# Patient Record
Sex: FEMALE | Race: WHITE | Employment: OTHER | URBAN - METROPOLITAN AREA
[De-identification: names, ages, dates, MRNs, and addresses within clinical notes are randomized per-mention and may not be internally consistent; named-entity substitution may affect disease eponyms.]

---

## 2019-11-07 PROBLEM — Z72.0 TOBACCO ABUSE: Status: ACTIVE | Noted: 2019-11-07

## 2019-11-07 PROBLEM — E04.2 MULTIPLE THYROID NODULES: Status: ACTIVE | Noted: 2018-06-12

## 2019-11-07 PROBLEM — E55.9 VITAMIN D DEFICIENCY: Status: ACTIVE | Noted: 2019-11-07

## 2019-11-07 PROBLEM — I05.9 MITRAL VALVE DISORDER: Status: ACTIVE | Noted: 2019-11-07

## 2019-11-07 PROBLEM — E78.00 HYPERCHOLESTEROLEMIA: Status: ACTIVE | Noted: 2018-06-12

## 2019-11-07 PROBLEM — D53.1 MEGALOBLASTIC ANEMIA DUE TO B12 DEFICIENCY: Status: ACTIVE | Noted: 2019-11-07

## 2021-02-13 ENCOUNTER — IMMUNIZATION (OUTPATIENT)
Dept: PRIMARY CARE CLINIC | Age: 66
End: 2021-02-13
Payer: MEDICARE

## 2021-02-13 PROCEDURE — 91300 COVID-19, PFIZER VACCINE 30MCG/0.3ML DOSE: CPT | Performed by: INTERNAL MEDICINE

## 2021-02-13 PROCEDURE — 0001A COVID-19, PFIZER VACCINE 30MCG/0.3ML DOSE: CPT | Performed by: INTERNAL MEDICINE

## 2021-03-02 DIAGNOSIS — E53.8 B12 DEFICIENCY: ICD-10-CM

## 2021-03-02 DIAGNOSIS — E04.2 MULTIPLE THYROID NODULES: ICD-10-CM

## 2021-03-02 PROBLEM — Z72.0 TOBACCO ABUSE: Status: RESOLVED | Noted: 2019-11-07 | Resolved: 2021-03-02

## 2021-03-02 LAB
T4 FREE: 1.05 NG/DL (ref 0.93–1.7)
TSH SERPL DL<=0.05 MIU/L-ACNC: 0.57 UIU/ML (ref 0.27–4.2)
VITAMIN B-12: 361 PG/ML (ref 211–946)

## 2021-03-08 ENCOUNTER — IMMUNIZATION (OUTPATIENT)
Dept: PRIMARY CARE CLINIC | Age: 66
End: 2021-03-08
Payer: MEDICARE

## 2021-03-08 PROCEDURE — 0002A COVID-19, PFIZER VACCINE 30MCG/0.3ML DOSE: CPT | Performed by: NURSE PRACTITIONER

## 2021-03-08 PROCEDURE — 91300 COVID-19, PFIZER VACCINE 30MCG/0.3ML DOSE: CPT | Performed by: NURSE PRACTITIONER

## 2021-08-26 ENCOUNTER — HOSPITAL ENCOUNTER (EMERGENCY)
Age: 66
Discharge: HOME | End: 2021-08-26
Payer: MEDICARE

## 2021-08-26 VITALS
HEIGHT: 64 IN | TEMPERATURE: 97.3 F | DIASTOLIC BLOOD PRESSURE: 62 MMHG | WEIGHT: 140 LBS | SYSTOLIC BLOOD PRESSURE: 108 MMHG | OXYGEN SATURATION: 98 % | HEART RATE: 82 BPM | RESPIRATION RATE: 16 BRPM | BODY MASS INDEX: 23.9 KG/M2

## 2021-08-26 DIAGNOSIS — H00.012 HORDEOLUM EXTERNUM OF RIGHT LOWER EYELID: Primary | ICD-10-CM

## 2021-08-26 DIAGNOSIS — H01.002 BLEPHARITIS OF RIGHT LOWER EYELID, UNSPECIFIED TYPE: ICD-10-CM

## 2021-08-26 PROCEDURE — 90715 TDAP VACCINE 7 YRS/> IM: CPT | Performed by: NURSE PRACTITIONER

## 2021-08-26 PROCEDURE — 90471 IMMUNIZATION ADMIN: CPT | Performed by: NURSE PRACTITIONER

## 2021-08-26 PROCEDURE — 6360000002 HC RX W HCPCS: Performed by: NURSE PRACTITIONER

## 2021-08-26 PROCEDURE — 99282 EMERGENCY DEPT VISIT SF MDM: CPT

## 2021-08-26 RX ORDER — ERYTHROMYCIN 5 MG/G
OINTMENT OPHTHALMIC
Qty: 3.5 G | Refills: 0 | Status: SHIPPED | OUTPATIENT
Start: 2021-08-26 | End: 2021-09-05

## 2021-08-26 RX ORDER — AZITHROMYCIN 250 MG/1
TABLET, FILM COATED ORAL
Qty: 1 PACKET | Refills: 0 | Status: SHIPPED | OUTPATIENT
Start: 2021-08-26 | End: 2021-09-05

## 2021-08-26 RX ADMIN — TETANUS TOXOID, REDUCED DIPHTHERIA TOXOID AND ACELLULAR PERTUSSIS VACCINE, ADSORBED 0.5 ML: 5; 2.5; 8; 8; 2.5 SUSPENSION INTRAMUSCULAR at 15:32

## 2021-08-29 NOTE — ED PROVIDER NOTES
[address]  Department of Emergency Medicine   ED  Encounter Note  Admit Date/RoomTime: 2021  2:38 PM  ED Room:     NAME: Sammi Woodson  : 1955  MRN: [id number]     Chief Complaint:  Facial Swelling (Was trying warm compresses. Noticed swelling in right cheek as well. )    History of Present Illness        Sammi Woodson is a 78 y.o. old female presenting to the emergency department by private vehicle, for non-traumatic waxing and waning erythema to right lower eye lid, which began several day(s) prior to arrival.  There has been no obvious mechanism causing complaint. Since onset her symptoms have been improving and mild in severity. Associated signs & symptoms of: erythema to the right lower eye lid. The patients tetanus status is unknown. Patient states that she called her primary care physician and was unable to be seen in the office. Patient was told to come to the emergency department for evaluation. Patient denies any blurred vision or pain. She denies any fever or chills. Circumstances:    []  Contact Lens Use     []  Recent URI Sx's     []  Spontaneous Onset     []  Close Contact w/similar Sx's     []  Work Related     History of:     []   Glaucoma     []   Recent Eye Surgery     ROS   Pertinent positives and negatives are stated within HPI, all other systems reviewed and are negative. Past Medical History:  has no past medical history on file. Surgical History:  has a past surgical history that includes Tonsillectomy (1959); Breast reduction surgery; hernia repair (); Cholecystectomy, laparoscopic (); Varicose vein surgery (2009); Colonoscopy (2008); and Hysterectomy, vaginal (1974). Social History:  reports that she quit smoking about 3 years ago. Her smoking use included cigarettes. She started smoking about 49 years ago. She has a 46.00 pack-year smoking history.  She has never used smokeless tobacco. She reports current alcohol use. Family History: family history includes Atrial Fibrillation in her mother; COPD in her mother; Cancer in her father; Hypertension in her brother and mother; Kidney Disease in her brother; Kidney stones in her brother. Allergies: Codeine, Morphine, Atorvastatin, Cephalexin, Cephalosporins, and Augmentin [amoxicillin-pot clavulanate]    Physical Exam   Oxygen Saturation Interpretation: Normal.        ED Triage Vitals   BP Temp Temp Source Pulse Resp SpO2 Height Weight   08/26/21 1446 08/26/21 1433 08/26/21 1433 08/26/21 1433 08/26/21 1433 08/26/21 1433 08/26/21 1445 08/26/21 1445   108/62 97.3 °F (36.3 °C) Infrared 82 16 98 % 5' 4\" (1.626 m) 140 lb (63.5 kg)         Constitutional:  Alert, development consistent with age. HENT:  NC/NT. Airway patent. Neck:  Normal ROM. Supple. Eyes:         Pupils: equal, round, reactive to light and accommodation. Eyelids: Right lower Swelling/redness:  mild swelling and erythema, hordeolum present       Conjunctiva: Bilateral no abnormal findings. Sclera: Bilateral normal appearing. Cornea: Bilateral normal.       EOM:  Intact Bilaterally. Fundoscopic:  grossly normal- discs sharp. Visual Acuity:  Within Normal Limit. Integument:  No rashes, erythema present, unless noted elsewhere. Lymphatics: No lymphangitis or adenopathy noted. Neurological:  Oriented. Motor functions intact. Lab / Imaging Results   (All laboratory and radiology results have been personally reviewed by myself)  Labs:  No results found for this visit on 08/26/21. Imaging: All Radiology results interpreted by Radiologist unless otherwise noted.   No orders to display       ED Course / Medical Decision Making     Medications   Tetanus-Diphth-Acell Pertussis (BOOSTRIX) injection 0.5 mL (0.5 mLs IntraMUSCular Given 8/26/21 1532)            Consult(s):   None    Procedure(s):  no procedures performed    MDM:   At this time the patient is without objective evidence of an acute process requiring hospitalization or inpatient management. They have remained hemodynamically stable throughout their entire ED visit and are stable for discharge with outpatient follow-up. The plan has been discussed in detail and they are aware of the specific conditions for emergent return, as well as the importance of follow-up. Plan of Care/Counseling:  RAFITA Staley CNP reviewed today's visit with the patient in addition to providing specific details for the plan of care and counseling regarding the diagnosis and prognosis. Questions are answered at this time and are agreeable with the plan. Assessment      1. Hordeolum externum of right lower eyelid    2. Blepharitis of right lower eyelid, unspecified type      Plan   Discharged home. Patient condition is good    New Medications     Discharge Medication List as of 8/26/2021  3:20 PM      START taking these medications    Details   erythromycin (ROMYCIN) 5 MG/GM ophthalmic ointment Apply 1/2 inch ribbon to the lower eyelid of the right eye 3 times a day for 10 days, Disp-3.5 g, R-0, Normal      azithromycin (ZITHROMAX Z-MIRACLE) 250 MG tablet TAKE 500MG PO DAY ONE. .. 250MG PO DAY TWO THROUGH FIVE DISPENSE 6 TABS NO REFILLS, Disp-1 packet, R-0Normal           Electronically signed by RAFITA Staley CNP   DD: 8/29/21  **This report was transcribed using voice recognition software. Every effort was made to ensure accuracy; however, inadvertent computerized transcription errors may be present.   END OF ED PROVIDER NOTE       RAFITA Andres CNP  08/29/21 2023

## 2021-08-30 ENCOUNTER — OFFICE VISIT (OUTPATIENT)
Dept: ENT CLINIC | Age: 66
End: 2021-08-30
Payer: MEDICARE

## 2021-08-30 VITALS
SYSTOLIC BLOOD PRESSURE: 111 MMHG | WEIGHT: 138 LBS | TEMPERATURE: 97.2 F | OXYGEN SATURATION: 96 % | HEIGHT: 65 IN | BODY MASS INDEX: 22.99 KG/M2 | DIASTOLIC BLOOD PRESSURE: 71 MMHG | HEART RATE: 78 BPM

## 2021-08-30 DIAGNOSIS — H61.22 IMPACTED CERUMEN OF LEFT EAR: Primary | ICD-10-CM

## 2021-08-30 PROCEDURE — 69210 REMOVE IMPACTED EAR WAX UNI: CPT | Performed by: NURSE PRACTITIONER

## 2021-08-30 PROCEDURE — G8399 PT W/DXA RESULTS DOCUMENT: HCPCS | Performed by: NURSE PRACTITIONER

## 2021-08-30 PROCEDURE — 1123F ACP DISCUSS/DSCN MKR DOCD: CPT | Performed by: NURSE PRACTITIONER

## 2021-08-30 PROCEDURE — G8427 DOCREV CUR MEDS BY ELIG CLIN: HCPCS | Performed by: NURSE PRACTITIONER

## 2021-08-30 PROCEDURE — 99203 OFFICE O/P NEW LOW 30 MIN: CPT | Performed by: NURSE PRACTITIONER

## 2021-08-30 PROCEDURE — 3017F COLORECTAL CA SCREEN DOC REV: CPT | Performed by: NURSE PRACTITIONER

## 2021-08-30 PROCEDURE — G8420 CALC BMI NORM PARAMETERS: HCPCS | Performed by: NURSE PRACTITIONER

## 2021-08-30 PROCEDURE — 1036F TOBACCO NON-USER: CPT | Performed by: NURSE PRACTITIONER

## 2021-08-30 PROCEDURE — 1090F PRES/ABSN URINE INCON ASSESS: CPT | Performed by: NURSE PRACTITIONER

## 2021-08-30 PROCEDURE — G9899 SCRN MAM PERF RSLTS DOC: HCPCS | Performed by: NURSE PRACTITIONER

## 2021-08-30 PROCEDURE — 4040F PNEUMOC VAC/ADMIN/RCVD: CPT | Performed by: NURSE PRACTITIONER

## 2021-08-30 NOTE — PROGRESS NOTES
[facility] Otolaryngology  Dr. Mirella Jones. MACIEL Hood Ms.Ed. New Consult       Patient Name:  Toño Garcia  :  1955     CHIEF C/O:    Chief Complaint   Patient presents with    Ear Problem     new patient with bilateral impacted cerumen        HISTORY OBTAINED FROM:  patient    HISTORY OF PRESENT ILLNESS:       Tanya Jansen is a 78y.o. year old female, here today for impacted cerumen bilaterally. Has had issues with cerumen impactions for many years  Recently had them cleaned at PCP office  Former patient of Dr. Kendall Kathleen who would clean her ears yearly  No current pain or drainage from either ear  No current muffled hearing  No hx of hearing loss  No hx of recurrent infections or previous ear surgeries. History reviewed. No pertinent past medical history. Past Surgical History:   Procedure Laterality Date    BREAST REDUCTION SURGERY      CHOLECYSTECTOMY, LAPAROSCOPIC      Dr Yahaira Main COLONOSCOPY  2008    Dr Stephen Storey HERNIA REPAIR  2004    HYSTERECTOMY, VAGINAL  1974    cervical cancer    TONSILLECTOMY  1959    VARICOSE VEIN SURGERY  2009       Current Outpatient Medications:     erythromycin (ROMYCIN) 5 MG/GM ophthalmic ointment, Apply 1/2 inch ribbon to the lower eyelid of the right eye 3 times a day for 10 days, Disp: 3.5 g, Rfl: 0    azithromycin (ZITHROMAX Z-MIRACLE) 250 MG tablet, TAKE 500MG PO DAY ONE. ..  250MG PO DAY TWO THROUGH FIVE DISPENSE 6 TABS NO REFILLS, Disp: 1 packet, Rfl: 0    cyanocobalamin 1000 MCG/ML injection, Inject 1 mL into the muscle every 30 days, Disp: 3 vial, Rfl: 5    Cholecalciferol 2000 units CAPS, Take 1 capsule by mouth daily, Disp: , Rfl:     estradiol (VIVELLE) 0.0375 MG/24HR, Remove the patch and replace every Monday and Thursday, Disp: , Rfl:     hydroquinone 4 % cream, APPLY SMALL AMOUNT TO FACE AT NIGHT, Disp: , Rfl: 3    methIMAzole (TAPAZOLE) 5 MG tablet, Take 5 mg by mouth daily , Disp: , Rfl:     tretinoin (RETIN-A) 0.05 % cream, Apply small amount to face once daily, Disp: , Rfl:     Calcium 500-100 MG-UNIT CHEW, Take 2 each by mouth daily, Disp: , Rfl:   Codeine, Morphine, Atorvastatin, Cephalexin, Cephalosporins, and Augmentin [amoxicillin-pot clavulanate]  Social History     Tobacco Use    Smoking status: Former Smoker     Packs/day: 1.00     Years: 46.00     Pack years: 46.00     Types: Cigarettes     Start date: 0     Quit date: 2018     Years since quitting: 3.6    Smokeless tobacco: Never Used    Tobacco comment: quit 2018   Substance Use Topics    Alcohol use: Yes     Comment: social drinker    Drug use: Not on file     Family History   Problem Relation Age of Onset    Atrial Fibrillation Mother     COPD Mother     Hypertension Mother     Cancer Father         bladder    Kidney Disease Brother     Kidney stones Brother     Hypertension Brother        Review of Systems   Constitutional: Negative. Negative for activity change and appetite change. HENT: Positive for hearing loss. Negative for ear discharge and ear pain. Eyes: Negative. Respiratory: Negative. Negative for shortness of breath and stridor. Cardiovascular: Negative. Negative for chest pain and palpitations. Endocrine: Negative. Musculoskeletal: Negative. Skin: Negative. Neurological: Negative. Negative for dizziness. Hematological: Negative. Psychiatric/Behavioral: Negative. /71 (Site: Left Upper Arm, Position: Sitting, Cuff Size: Medium Adult)   Pulse 78   Temp 97.2 °F (36.2 °C)   Ht 5' 4.5\" (1.638 m)   Wt 138 lb (62.6 kg)   SpO2 96%   BMI 23.32 kg/m²   Physical Exam  Constitutional:       Appearance: Normal appearance. HENT:      Head: Normocephalic. Right Ear: Tympanic membrane, ear canal and external ear normal.      Left Ear: Tympanic membrane, ear canal and external ear normal. There is impacted cerumen. Ears:      Comments: Left TM normal after cerumen removed.      Nose: Nose normal. No rhinorrhea. Right Turbinates: Not pale. Left Turbinates: Not pale. Mouth/Throat:      Lips: Pink. Mouth: Mucous membranes are moist.      Pharynx: Oropharynx is clear. Eyes:      Conjunctiva/sclera: Conjunctivae normal.      Pupils: Pupils are equal, round, and reactive to light. Cardiovascular:      Rate and Rhythm: Normal rate and regular rhythm. Pulses: Normal pulses. Pulmonary:      Effort: Pulmonary effort is normal. No respiratory distress. Breath sounds: No stridor. Musculoskeletal:         General: Normal range of motion. Cervical back: Normal range of motion. No rigidity. No muscular tenderness. Skin:     General: Skin is warm and dry. Neurological:      General: No focal deficit present. Mental Status: She is alert and oriented to person, place, and time. Psychiatric:         Mood and Affect: Mood normal.         Behavior: Behavior normal.         Thought Content: Thought content normal.         Judgment: Judgment normal.         IMPRESSION/PLAN:  Cerumen removal     Auditory canal(s) left ear completely obstructed with cerumen. Cerumen was gently removed using soft plastic curette, gentle suction. Tympanic membranes are intact following the procedure. Auditory canals appear normal.      Jinny Anderson was seen today for ear problem. Diagnoses and all orders for this visit:    Impacted cerumen of left ear  -     OK REMOVAL IMPACTED CERUMEN INSTRUMENTATION UNILAT        Left cerumen impaction removed without difficulty. Patient denies any current pain or discomfort. Recommended that patient begin using Debrox 1-2 times monthly and then 3 to 5 days prior to her next appointment. She will follow up in 6 months for repeat cleaning. She is instructed to call with any new or worsening symptoms prior to her next appointment.         Ashley Blancas, MSN, FNP-C  [address], Nose and Throat    The information contained in this note has been dictated using drug and medical speech recognition software and may contain errors

## 2021-09-07 ASSESSMENT — ENCOUNTER SYMPTOMS
EYES NEGATIVE: 1
SHORTNESS OF BREATH: 0
STRIDOR: 0
RESPIRATORY NEGATIVE: 1

## 2021-10-20 DIAGNOSIS — E78.00 HYPERCHOLESTEROLEMIA: ICD-10-CM

## 2021-10-20 LAB
REASON FOR REJECTION: NORMAL
REJECTED TEST: NORMAL

## 2022-02-28 ENCOUNTER — OFFICE VISIT (OUTPATIENT)
Dept: ENT CLINIC | Age: 67
End: 2022-02-28
Payer: MEDICARE

## 2022-02-28 VITALS
SYSTOLIC BLOOD PRESSURE: 108 MMHG | DIASTOLIC BLOOD PRESSURE: 74 MMHG | BODY MASS INDEX: 23.49 KG/M2 | HEART RATE: 81 BPM | WEIGHT: 141 LBS | HEIGHT: 65 IN

## 2022-02-28 DIAGNOSIS — H61.23 BILATERAL IMPACTED CERUMEN: Primary | ICD-10-CM

## 2022-02-28 PROCEDURE — 69210 REMOVE IMPACTED EAR WAX UNI: CPT | Performed by: NURSE PRACTITIONER

## 2022-02-28 RX ORDER — ERGOCALCIFEROL 1.25 MG/1
CAPSULE ORAL
COMMUNITY
Start: 2022-02-08

## 2022-02-28 RX ORDER — ROSUVASTATIN CALCIUM 10 MG/1
TABLET, COATED ORAL
COMMUNITY
Start: 2022-02-08

## 2022-08-02 ENCOUNTER — PROCEDURE VISIT (OUTPATIENT)
Dept: AUDIOLOGY | Age: 67
End: 2022-08-02
Payer: MEDICARE

## 2022-08-02 ENCOUNTER — OFFICE VISIT (OUTPATIENT)
Dept: ENT CLINIC | Age: 67
End: 2022-08-02
Payer: MEDICARE

## 2022-08-02 VITALS
SYSTOLIC BLOOD PRESSURE: 115 MMHG | HEIGHT: 64 IN | DIASTOLIC BLOOD PRESSURE: 73 MMHG | HEART RATE: 80 BPM | WEIGHT: 141 LBS | BODY MASS INDEX: 24.07 KG/M2

## 2022-08-02 DIAGNOSIS — H65.02 ACUTE SEROUS OTITIS MEDIA OF LEFT EAR, RECURRENCE NOT SPECIFIED: ICD-10-CM

## 2022-08-02 DIAGNOSIS — H69.82 DYSFUNCTION OF LEFT EUSTACHIAN TUBE: Primary | ICD-10-CM

## 2022-08-02 DIAGNOSIS — H61.23 BILATERAL IMPACTED CERUMEN: ICD-10-CM

## 2022-08-02 DIAGNOSIS — H66.92 LEFT OTITIS MEDIA, UNSPECIFIED OTITIS MEDIA TYPE: Primary | ICD-10-CM

## 2022-08-02 PROCEDURE — G8420 CALC BMI NORM PARAMETERS: HCPCS | Performed by: NURSE PRACTITIONER

## 2022-08-02 PROCEDURE — G8427 DOCREV CUR MEDS BY ELIG CLIN: HCPCS | Performed by: NURSE PRACTITIONER

## 2022-08-02 PROCEDURE — G8399 PT W/DXA RESULTS DOCUMENT: HCPCS | Performed by: NURSE PRACTITIONER

## 2022-08-02 PROCEDURE — 1090F PRES/ABSN URINE INCON ASSESS: CPT | Performed by: NURSE PRACTITIONER

## 2022-08-02 PROCEDURE — 92567 TYMPANOMETRY: CPT | Performed by: AUDIOLOGIST

## 2022-08-02 PROCEDURE — 1036F TOBACCO NON-USER: CPT | Performed by: NURSE PRACTITIONER

## 2022-08-02 PROCEDURE — 69210 REMOVE IMPACTED EAR WAX UNI: CPT | Performed by: NURSE PRACTITIONER

## 2022-08-02 PROCEDURE — 1123F ACP DISCUSS/DSCN MKR DOCD: CPT | Performed by: NURSE PRACTITIONER

## 2022-08-02 PROCEDURE — 99213 OFFICE O/P EST LOW 20 MIN: CPT | Performed by: NURSE PRACTITIONER

## 2022-08-02 PROCEDURE — 3017F COLORECTAL CA SCREEN DOC REV: CPT | Performed by: NURSE PRACTITIONER

## 2022-08-02 ASSESSMENT — ENCOUNTER SYMPTOMS
SINUS PAIN: 0
RESPIRATORY NEGATIVE: 1
RHINORRHEA: 0
SINUS PRESSURE: 0
STRIDOR: 0
SHORTNESS OF BREATH: 0
EYES NEGATIVE: 1

## 2022-08-02 NOTE — PROGRESS NOTES
[facility] Otolaryngology  Dr. Minh Alas. Ban Russo Ms.Ed        Patient Name:  Emy Jacinto  :  1955     CHIEF C/O:    Chief Complaint   Patient presents with    Follow-up     F/u 6 month cerumen       HISTORY OBTAINED FROM:  patient    HISTORY OF PRESENT ILLNESS:       Rahul Barakat is a 75y.o. year old female, here today for follow up of cerumen impactions and ear fullness. Patient was last seen 6 months ago for cerumen impactions with removal.  She states that over the last 1 month after suffering from congestion and other sinus symptoms she has noticed increased fullness in the left ear. She denies any pain or pressure at this time. She denies any increased tinnitus at this time. She does state that her hearing in the left ear is muffled. She denies any use of Debrox for cerumen removal stating that it makes her ears feel more clogged. She denies any recent fevers or recent antibiotics. She denies any current congestion, rhinorrhea, or postnasal drainage. She states she recently tried Astepro with no relief and then started Flonase which she states is making some improvement in her fullness symptoms.       Past Medical History:   Diagnosis Date    Hx of mitral valve prolapse      Past Surgical History:   Procedure Laterality Date    BREAST REDUCTION SURGERY      CHOLECYSTECTOMY, LAPAROSCOPIC      Dr Percy Jackson    COLONOSCOPY  2008    Dr Chucho Ness 48  2004    HYSTERECTOMY, VAGINAL  1974    cervical cancer    TONSILLECTOMY  1959    VARICOSE VEIN SURGERY  2009       Current Outpatient Medications:     rosuvastatin (CRESTOR) 10 MG tablet, TAKE 1 TABLET BY MOUTH ONCE A DAY, Disp: , Rfl:     Coenzyme Q10 (CO Q 10 PO), Take by mouth, Disp: , Rfl:     cyanocobalamin 1000 MCG/ML injection, Inject 1 mL into the muscle every 30 days, Disp: 3 vial, Rfl: 5    Cholecalciferol 2000 units CAPS, Take 1 capsule by mouth daily, Disp: , Rfl:     estradiol (Dariana Genera) 0.0375 MG/24HR, Remove the patch and replace every Monday and Thursday, Disp: , Rfl:     hydroquinone 4 % cream, APPLY SMALL AMOUNT TO FACE AT NIGHT, Disp: , Rfl: 3    methIMAzole (TAPAZOLE) 5 MG tablet, Take 5 mg by mouth daily , Disp: , Rfl:     tretinoin (RETIN-A) 0.05 % cream, Apply small amount to face once daily, Disp: , Rfl:     Calcium 500-100 MG-UNIT CHEW, Take 2 each by mouth daily, Disp: , Rfl:     vitamin D (ERGOCALCIFEROL) 1.25 MG (02833 UT) CAPS capsule, TAKE 1 CAPSULE BY MOUTH EVERY WEEK (Patient not taking: Reported on 2022), Disp: , Rfl:   Codeine, Morphine, Cephalexin, Cephalosporins, Levofloxacin, and Augmentin [amoxicillin-pot clavulanate]  Social History     Tobacco Use    Smoking status: Former     Packs/day: 1.00     Years: 46.00     Pack years: 46.00     Types: Cigarettes     Start date: 0     Quit date:      Years since quittin.5    Smokeless tobacco: Never    Tobacco comments:     quit 2018   Substance Use Topics    Alcohol use: Yes     Comment: social drinker    Drug use: Never     Family History   Problem Relation Age of Onset    Atrial Fibrillation Mother     COPD Mother     Hypertension Mother     Cancer Father         bladder    Kidney Disease Brother     Kidney stones Brother     Hypertension Brother        Review of Systems   Constitutional: Negative. Negative for activity change and appetite change. HENT:  Positive for ear pain (Fullness left ear) and hearing loss. Negative for congestion, postnasal drip, rhinorrhea, sinus pressure and sinus pain. Eyes: Negative. Respiratory: Negative. Negative for shortness of breath and stridor. Cardiovascular: Negative. Negative for chest pain and palpitations. Endocrine: Negative. Musculoskeletal: Negative. Skin: Negative. Neurological: Negative. Negative for dizziness. Hematological: Negative. Psychiatric/Behavioral: Negative.        /73   Pulse 80   Ht 5' 4\" (1.626 m)   Wt 141 lb (64 kg) cerumen she continued to have a muffled sensation in the left ear. Visual exam reveals what appears to be a middle ear effusion. This was confirmed on tympanogram.  At this time she will continue with the Flonase she recently started, 2 sprays each nostril once daily with nasal saline spray, 2 sprays each nostril 3-4 times daily. She will follow-up in 6 weeks for reevaluation. She is instructed to call with any new or worsening of symptoms prior to her next appointment.       ENRIQUE Zheng, FNP-C  [address] [place] [address], Nose and Throat    The information contained in this note has been dictated using drug and medical speech recognition software and may contain errors

## 2022-09-23 ENCOUNTER — PROCEDURE VISIT (OUTPATIENT)
Dept: AUDIOLOGY | Age: 67
End: 2022-09-23
Payer: MEDICARE

## 2022-09-23 ENCOUNTER — OFFICE VISIT (OUTPATIENT)
Dept: ENT CLINIC | Age: 67
End: 2022-09-23
Payer: MEDICARE

## 2022-09-23 VITALS
HEIGHT: 64 IN | SYSTOLIC BLOOD PRESSURE: 105 MMHG | DIASTOLIC BLOOD PRESSURE: 70 MMHG | WEIGHT: 135 LBS | HEART RATE: 81 BPM | BODY MASS INDEX: 23.05 KG/M2

## 2022-09-23 DIAGNOSIS — J34.2 DNS (DEVIATED NASAL SEPTUM): ICD-10-CM

## 2022-09-23 DIAGNOSIS — R53.82 CHRONIC FATIGUE: Primary | ICD-10-CM

## 2022-09-23 DIAGNOSIS — G47.30 SLEEP DISORDER BREATHING: ICD-10-CM

## 2022-09-23 DIAGNOSIS — R06.83 SNORING: ICD-10-CM

## 2022-09-23 DIAGNOSIS — H93.8X3 EAR FULLNESS, BILATERAL: Primary | ICD-10-CM

## 2022-09-23 DIAGNOSIS — R09.81 NASAL CONGESTION: ICD-10-CM

## 2022-09-23 PROCEDURE — 1090F PRES/ABSN URINE INCON ASSESS: CPT | Performed by: NURSE PRACTITIONER

## 2022-09-23 PROCEDURE — G8427 DOCREV CUR MEDS BY ELIG CLIN: HCPCS | Performed by: NURSE PRACTITIONER

## 2022-09-23 PROCEDURE — 92567 TYMPANOMETRY: CPT | Performed by: AUDIOLOGIST

## 2022-09-23 PROCEDURE — 1036F TOBACCO NON-USER: CPT | Performed by: NURSE PRACTITIONER

## 2022-09-23 PROCEDURE — G8399 PT W/DXA RESULTS DOCUMENT: HCPCS | Performed by: NURSE PRACTITIONER

## 2022-09-23 PROCEDURE — 3017F COLORECTAL CA SCREEN DOC REV: CPT | Performed by: NURSE PRACTITIONER

## 2022-09-23 PROCEDURE — G8420 CALC BMI NORM PARAMETERS: HCPCS | Performed by: NURSE PRACTITIONER

## 2022-09-23 PROCEDURE — 1123F ACP DISCUSS/DSCN MKR DOCD: CPT | Performed by: NURSE PRACTITIONER

## 2022-09-23 PROCEDURE — 99214 OFFICE O/P EST MOD 30 MIN: CPT | Performed by: NURSE PRACTITIONER

## 2022-09-23 RX ORDER — FLUTICASONE PROPIONATE 50 MCG
2 SPRAY, SUSPENSION (ML) NASAL DAILY
Qty: 48 G | Refills: 1 | Status: SHIPPED | OUTPATIENT
Start: 2022-09-23

## 2022-09-23 ASSESSMENT — ENCOUNTER SYMPTOMS
SHORTNESS OF BREATH: 0
SINUS PRESSURE: 0
RHINORRHEA: 0
EYES NEGATIVE: 1
SINUS PAIN: 0
RESPIRATORY NEGATIVE: 1
STRIDOR: 0

## 2022-09-23 NOTE — PROGRESS NOTES
This patient was referred for tympanometric testing by HENRIQUE Hernandez due to ear fullness. Tympanometry revealed normal middle ear peak pressure and compliance, bilaterally. The results were reviewed with the patient. Recommendations for follow up will be made pending physician consult.     Electronically signed by Dave Vega on 9/23/2022 at 3:52 PM

## 2022-09-23 NOTE — PROGRESS NOTES
[facility] Otolaryngology  Dr. Dusty Ames Ms.Ed        Patient Name:  Vikram Peguero  :  1955     CHIEF C/O:    Chief Complaint   Patient presents with    Follow-up     6 week b/l ear pressure/cerumen       HISTORY OBTAINED FROM:  patient    HISTORY OF PRESENT ILLNESS:       [facility] is a 75y.o. year old female, here today for follow up of ear fullness. Patient was last seen 6 weeks ago and placed on Flonase nasal spray which she used for several weeks with improvement in her ear pressure. She states that she is at this time using the medication more intermittently but has stopped using the Astelin spray as she did not tolerate the aftertaste. She currently denies any pressure or muffled hearing sensation. Patient does continue to complain of mild congestion symptoms and has noticed an increase in her snoring. She also suffers from chronic fatigue throughout the day and feels that she does not sleep well at night. She denies any previous sleep study or work-up for sleep apnea.         Past Medical History:   Diagnosis Date    Hx of mitral valve prolapse      Past Surgical History:   Procedure Laterality Date    BREAST REDUCTION SURGERY      CHOLECYSTECTOMY, LAPAROSCOPIC      Dr Amanda Hawley    COLONOSCOPY  2008    Dr Becka Cowan    [address]      HYSTERECTOMY, VAGINAL  1974    cervical cancer    TONSILLECTOMY  1959    VARICOSE VEIN SURGERY  2009       Current Outpatient Medications:     rosuvastatin (CRESTOR) 10 MG tablet, TAKE 1 TABLET BY MOUTH ONCE A DAY, Disp: , Rfl:     vitamin D (ERGOCALCIFEROL) 1.25 MG (79257 UT) CAPS capsule, , Disp: , Rfl:     Coenzyme Q10 (CO Q 10 PO), Take by mouth, Disp: , Rfl:     cyanocobalamin 1000 MCG/ML injection, Inject 1 mL into the muscle every 30 days, Disp: 3 vial, Rfl: 5    Cholecalciferol 2000 units CAPS, Take 1 capsule by mouth daily, Disp: , Rfl:     estradiol (VIVELLE) 0.0375 MG/24HR, Remove the patch and replace every Monday and Thursday, Disp: , Rfl:     hydroquinone 4 % cream, APPLY SMALL AMOUNT TO FACE AT NIGHT, Disp: , Rfl: 3    methIMAzole (TAPAZOLE) 5 MG tablet, Take 5 mg by mouth daily , Disp: , Rfl:     tretinoin (RETIN-A) 0.05 % cream, Apply small amount to face once daily, Disp: , Rfl:     Calcium 500-100 MG-UNIT CHEW, Take 2 each by mouth daily, Disp: , Rfl:   Codeine, Morphine, Cephalexin, Cephalosporins, Levofloxacin, and Augmentin [amoxicillin-pot clavulanate]  Social History     Tobacco Use    Smoking status: Former     Packs/day: 1.00     Years: 46.00     Pack years: 46.00     Types: Cigarettes     Start date: 0     Quit date:      Years since quittin.7    Smokeless tobacco: Never    Tobacco comments:     quit 2018   Substance Use Topics    Alcohol use: Yes     Comment: social drinker    Drug use: Never     Family History   Problem Relation Age of Onset    Atrial Fibrillation Mother     COPD Mother     Hypertension Mother     Cancer Father         bladder    Kidney Disease Brother     Kidney stones Brother     Hypertension Brother        Review of Systems   Constitutional: Negative. Negative for activity change and appetite change. HENT:  Negative for congestion, ear pain, hearing loss, postnasal drip, rhinorrhea, sinus pressure and sinus pain. Snoring   Eyes: Negative. Respiratory: Negative. Negative for shortness of breath and stridor. Cardiovascular: Negative. Negative for chest pain and palpitations. Endocrine: Negative. Musculoskeletal: Negative. Skin: Negative. Neurological: Negative. Negative for dizziness. Hematological: Negative. Psychiatric/Behavioral: Negative. /70   Pulse 81   Ht 5' 4\" (1.626 m)   Wt 135 lb (61.2 kg)   BMI 23.17 kg/m²   Physical Exam  Constitutional:       Appearance: Normal appearance. HENT:      Head: Normocephalic.       Right Ear: Tympanic membrane, ear canal and external ear normal. There is no impacted cerumen. Left Ear: Ear canal and external ear normal. There is no impacted cerumen. Nose: Septal deviation and congestion present. No rhinorrhea. Right Turbinates: Swollen and pale. Left Turbinates: Swollen and pale. Mouth/Throat:      Lips: Pink. Mouth: Mucous membranes are moist.      Pharynx: Oropharynx is clear. Eyes:      Conjunctiva/sclera: Conjunctivae normal.      Pupils: Pupils are equal, round, and reactive to light. Cardiovascular:      Rate and Rhythm: Normal rate and regular rhythm. Pulses: Normal pulses. Pulmonary:      Effort: Pulmonary effort is normal. No respiratory distress. Breath sounds: No stridor. Musculoskeletal:         General: Normal range of motion. Cervical back: Normal range of motion. No rigidity. No muscular tenderness. Skin:     General: Skin is warm and dry. Neurological:      General: No focal deficit present. Mental Status: She is alert and oriented to person, place, and time. Psychiatric:         Mood and Affect: Mood normal.         Behavior: Behavior normal.         Thought Content: Thought content normal.         Judgment: Judgment normal.     Tympanogram reviewed with patient. Reveals type A curve in the right ear, with type A curve in the left ear. IMPRESSION/PLAN:    Rahul Barakat was seen today for follow-up. Diagnoses and all orders for this visit:    Chronic fatigue  -     Baseline Diagnostic Sleep Study; Future    Snoring  -     Baseline Diagnostic Sleep Study; Future    Sleep disorder breathing  -     Baseline Diagnostic Sleep Study; Future    Nasal congestion    DNS (deviated nasal septum)    Other orders  -     fluticasone (FLONASE) 50 MCG/ACT nasal spray; 2 sprays by Each Nostril route daily    Tympanogram reviewed with patient showing normal type a curves bilaterally. Patient does have significant congestion and swelling of the nasal sinuses with mild rightward nasal septal deviation.   Due to her nasal congestion and other complaints including snoring and chronic fatigue it is recommended that the patient may benefit from a sleep study for further evaluation of her symptoms for possible sleep apnea. She does agree to this plan with an order placed for [address]. She is encouraged to continue with her Flonase, 2 sprays each nostril once daily with nasal saline spray, 2 sprays each nostril 3-4 times daily. At this time we will schedule a 6-month follow-up for routine cerumen removal with patient to call once her sleep study is scheduled for a follow-up appointment to review results. She agrees to this plan.   She will call for any new or worsening symptoms prior to her next appointment       ENRIQUE Ricks, FNP-C  [address] [place] [address], Nose and Throat    The information contained in this note has been dictated using drug and medical speech recognition software and may contain errors

## 2022-10-27 ENCOUNTER — TELEPHONE (OUTPATIENT)
Dept: ENT CLINIC | Age: 67
End: 2022-10-27

## 2022-10-27 NOTE — TELEPHONE ENCOUNTER
Called Pt to F/U on sleep study that has not been scheduled yet. Pt reports she is currently undergoing some heart testing and will call to schedule sleep when some other issues have been ruled out or resolved.

## 2022-12-07 ENCOUNTER — HOSPITAL ENCOUNTER (EMERGENCY)
Age: 67
Discharge: HOME | End: 2022-12-07
Payer: MEDICARE

## 2022-12-07 VITALS
HEART RATE: 78 BPM | WEIGHT: 128 LBS | RESPIRATION RATE: 16 BRPM | TEMPERATURE: 98.3 F | DIASTOLIC BLOOD PRESSURE: 57 MMHG | BODY MASS INDEX: 21.97 KG/M2 | SYSTOLIC BLOOD PRESSURE: 117 MMHG

## 2022-12-07 DIAGNOSIS — J06.9 VIRAL URI WITH COUGH: Primary | ICD-10-CM

## 2022-12-07 LAB
INFLUENZA A: NOT DETECTED
INFLUENZA B: NOT DETECTED
SARS-COV-2 RNA, RT PCR: NOT DETECTED

## 2022-12-07 PROCEDURE — 87636 SARSCOV2 & INF A&B AMP PRB: CPT

## 2022-12-07 PROCEDURE — 99283 EMERGENCY DEPT VISIT LOW MDM: CPT

## 2022-12-07 NOTE — Clinical Note
Antoine Mcmanus was seen and treated in our emergency department on 12/7/2022. She may return to work on 12/12/2022. If you have any questions or concerns, please don't hesitate to call.       Wenceslao Escobedo, APRN - CNP

## 2022-12-08 NOTE — DISCHARGE INSTRUCTIONS
Please continue the over-the-counter treatments that you have been trying. Your COVID, influenza was negative. I do not see a benefit of testing for RSV. If your symptoms persist or worsen please follow-up with your PCP.

## 2022-12-08 NOTE — ED PROVIDER NOTES
[facility]  Department of Emergency Medicine   ED  Encounter Note  Admit Date/RoomTime: 2022 10:51 PM  ED Room: AQUILES/AQUILES    NAME: Noreen Pearce  : 1955  MRN: [id number]     Chief Complaint:  Cough (Cough congestion chills and fatigue since )    History of Present Illness       Noreen Pearce is a 75 y.o. old female who presents to the emergency department by private vehicle, with complaints of a 3-day history of cough, congestion, chills, fatigue, body aches, nasal congestion, nasal discharge that started on 2022. She denies any measurable fever. She denies any known sick contacts. She has using over-the-counter treatments including DayQuil, Tylenol without much relief. She reports her symptoms are moderate but cannot identify any exacerbating or remitting factors. ROS   Pertinent positives and negatives are stated within HPI, all other systems reviewed and are negative. Past Medical History:  has a past medical history of Hx of mitral valve prolapse. Surgical History:  has a past surgical history that includes Tonsillectomy (1959); Breast reduction surgery; hernia repair (); Cholecystectomy, laparoscopic (); Varicose vein surgery (2009); Colonoscopy (2008); Hysterectomy, vaginal (1974); and Foot surgery. Social History:  reports that she quit smoking about 4 years ago. Her smoking use included cigarettes. She started smoking about 50 years ago. She has a 46.00 pack-year smoking history. She has never used smokeless tobacco. She reports current alcohol use. She reports that she does not use drugs. Family History: family history includes Atrial Fibrillation in her mother; COPD in her mother; Cancer in her father; Hypertension in her brother and mother; Kidney Disease in her brother; Kidney stones in her brother.      Allergies: Codeine, Morphine, Cephalexin, Cephalosporins, Levofloxacin, and Augmentin [amoxicillin-pot clavulanate]    Physical Exam   Oxygen Saturation Interpretation: Normal on room air analysis. ED Triage Vitals   BP Temp Temp Source Heart Rate Resp SpO2 Height Weight   12/07/22 2038 12/07/22 2038 12/07/22 2038 12/07/22 2038 12/07/22 2038 -- -- 12/07/22 2117   (!) 117/57 98.3 °F (36.8 °C) Oral 78 16   128 lb (58.1 kg)   Personally checked SPO2 98% on room air. Constitutional:  Alert, development consistent with age. Ears:  External Ears: Bilateral normal.               TM's & External Canals: normal TM's and external ear canals bilaterally. Nose:   There is no discharge, swelling or lesions noted. Sinuses: no bilateral maxillary sinus tenderness. no bilateral frontal sinus tenderness. Mouth:  normal tongue and buccal mucosa. Throat: no erythema or exudates noted. Teeth and gums normal..  Airway patent. Neck:  Supple. No meningeal signs. There is no  anterior cervical node tenderness. Respiratory:   Breath sounds: bilateral normal.  Lung sounds: normal.   CV:  Regular rate and rhythm, normal heart sounds, without pathological murmurs, ectopy, gallops, or rubs. GI:  Abdomen Soft, nontender, good bowel sounds. No firm or pulsatile mass. Integument:  Normal turgor. Warm, dry, without visible rash. Neurological:  Oriented. Motor functions intact. Lab / Imaging Results   (All laboratory and radiology results have been personally reviewed by myself)  Labs:  Results for orders placed or performed during the hospital encounter of 12/07/22   COVID-19 & Influenza Combo    Specimen: Nasopharyngeal Swab   Result Value Ref Range    SARS-CoV-2 RNA, RT PCR NOT DETECTED NOT DETECTED    INFLUENZA A NOT DETECTED NOT DETECTED    INFLUENZA B NOT DETECTED NOT DETECTED       Imaging: All Radiology results interpreted by Radiologist unless otherwise noted.   No orders to display       ED Course / Medical Decision Making   Medications - No data to display       Medical Decision Making: This is a 68-year-old female with a 3-day history of cough, nasal congestion, nasal discharge, chills, body aches who has tested negative for influenza and COVID-19. She has been managing her symptoms with over-the-counter cough and cold medication. She is nontoxic in appearance with stable vital signs. She declines any prescription cough and cold medication as she reports she has plenty of medication at home that she is happy to continue using. She was advised despite the negative viral panel as above that this is likely a viral illness that will resolve on its own. I advised that should she develop any difficulty breathing, difficulty swallowing, chest pain, shortness of breath that she should return immediately to the ER. Advised otherwise that she should follow-up with her PCP. Assessment     1. Viral URI with cough      Plan   Discharged home. Patient condition is good    New Medications     Discharge Medication List as of 12/7/2022 11:26 PM        Electronically signed by RAFITA Álvarez CNP   DD: 12/7/22  **This report was transcribed using voice recognition software. Every effort was made to ensure accuracy; however, inadvertent computerized transcription errors may be present.   END OF ED PROVIDER NOTE        RAFITA Álvarez CNP  12/08/22 3609

## 2023-03-24 ENCOUNTER — OFFICE VISIT (OUTPATIENT)
Dept: ENT CLINIC | Age: 68
End: 2023-03-24

## 2023-03-24 VITALS — WEIGHT: 123 LBS | BODY MASS INDEX: 20.49 KG/M2 | HEIGHT: 65 IN

## 2023-03-24 DIAGNOSIS — H61.23 BILATERAL IMPACTED CERUMEN: Primary | ICD-10-CM

## 2023-03-24 NOTE — PROGRESS NOTES
Subjective:      Patient ID:  Kelley Kothari is a 75 y.o. female. HPI:    Pt presents with a history of cerumen impaction removal.   The patients ear was last cleaned 6 month(s) ago. The patient was not using ear drops to loosen wax immediately prior to this visit. Hearing aids: no      Past Medical History:   Diagnosis Date    Hx of mitral valve prolapse      Past Surgical History:   Procedure Laterality Date    BREAST REDUCTION SURGERY      CHOLECYSTECTOMY, LAPAROSCOPIC      Dr Katie Peres    COLONOSCOPY  2008    Dr Nesha Pretty      HYSTERECTOMY, VAGINAL  1974    cervical cancer    TONSILLECTOMY  1959    VARICOSE VEIN SURGERY  2009     Family History   Problem Relation Age of Onset    Atrial Fibrillation Mother     COPD Mother     Hypertension Mother     Cancer Father         bladder    Kidney Disease Brother     Kidney stones Brother     Hypertension Brother      Social History     Socioeconomic History    Marital status:      Spouse name: None    Number of children: None    Years of education: None    Highest education level: None   Tobacco Use    Smoking status: Former     Packs/day: 1.00     Years: 46.00     Pack years: 46.00     Types: Cigarettes     Start date: 0     Quit date:      Years since quittin.2    Smokeless tobacco: Never    Tobacco comments:     quit 2018   Substance and Sexual Activity    Alcohol use: Yes     Comment: social drinker    Drug use: Never     Allergies   Allergen Reactions    Codeine Hives and Nausea And Vomiting     throat closes      Morphine Anaphylaxis     throat closes      Cephalexin Hives and Itching    Cephalosporins Hives and Itching    Levofloxacin Other (See Comments)    Augmentin [Amoxicillin-Pot Clavulanate] Diarrhea       Review of Systems   HENT:  Negative for ear discharge, ear pain and hearing loss. Objective: There were no vitals filed for this visit.   Physical

## 2023-03-26 ENCOUNTER — APPOINTMENT (OUTPATIENT)
Dept: CT IMAGING | Age: 68
End: 2023-03-26
Payer: MEDICARE

## 2023-03-26 ENCOUNTER — APPOINTMENT (OUTPATIENT)
Dept: GENERAL RADIOLOGY | Age: 68
End: 2023-03-26
Payer: MEDICARE

## 2023-03-26 ENCOUNTER — HOSPITAL ENCOUNTER (EMERGENCY)
Age: 68
Discharge: HOME | End: 2023-03-27
Attending: EMERGENCY MEDICINE
Payer: MEDICARE

## 2023-03-26 DIAGNOSIS — S20.212A CONTUSION OF LEFT CHEST WALL, INITIAL ENCOUNTER: ICD-10-CM
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
## 2023-03-26 DIAGNOSIS — S70.02XA CONTUSION OF LEFT HIP, INITIAL ENCOUNTER: ICD-10-CM

## 2023-03-26 LAB
ALBUMIN SERPL-MCNC: 3.8 G/DL (ref 3.5–5.2)
ALP SERPL-CCNC: 103 U/L (ref 35–104)
ALT SERPL-CCNC: 27 U/L (ref 0–32)
ANION GAP SERPL CALCULATED.3IONS-SCNC: 9 MMOL/L (ref 7–16)
AST SERPL-CCNC: 33 U/L (ref 0–31)
BASOPHILS # BLD: 0.05 E9/L (ref 0–0.2)
BASOPHILS NFR BLD: 0.7 % (ref 0–2)
BILIRUB SERPL-MCNC: 0.3 MG/DL (ref 0–1.2)
BUN SERPL-MCNC: 21 MG/DL (ref 6–23)
CALCIUM SERPL-MCNC: 8.9 MG/DL (ref 8.6–10.2)
CHLORIDE SERPL-SCNC: 107 MMOL/L (ref 98–107)
CO2 SERPL-SCNC: 25 MMOL/L (ref 22–29)
CREAT SERPL-MCNC: 0.8 MG/DL (ref 0.5–1)
EOSINOPHIL # BLD: 0.04 E9/L (ref 0.05–0.5)
EOSINOPHIL NFR BLD: 0.5 % (ref 0–6)
ERYTHROCYTE [DISTWIDTH] IN BLOOD BY AUTOMATED COUNT: 13 FL (ref 11.5–15)
GLUCOSE SERPL-MCNC: 112 MG/DL (ref 74–99)
HCT VFR BLD AUTO: 41.3 % (ref 34–48)
HGB BLD-MCNC: 13.6 G/DL (ref 11.5–15.5)
IMM GRANULOCYTES # BLD: 0.03 E9/L
IMM GRANULOCYTES NFR BLD: 0.4 % (ref 0–5)
LYMPHOCYTES # BLD: 1.12 E9/L (ref 1.5–4)
LYMPHOCYTES NFR BLD: 15.3 % (ref 20–42)
MCH RBC QN AUTO: 31.3 PG (ref 26–35)
MCHC RBC AUTO-ENTMCNC: 32.9 % (ref 32–34.5)
MCV RBC AUTO: 95.2 FL (ref 80–99.9)
MONOCYTES # BLD: 0.64 E9/L (ref 0.1–0.95)
MONOCYTES NFR BLD: 8.7 % (ref 2–12)
NEUTROPHILS # BLD: 5.44 E9/L (ref 1.8–7.3)
NEUTS SEG NFR BLD: 74.4 % (ref 43–80)
PLATELET # BLD AUTO: 218 E9/L (ref 130–450)
PMV BLD AUTO: 9.7 FL (ref 7–12)
POTASSIUM SERPL-SCNC: 4 MMOL/L (ref 3.5–5)
PROT SERPL-MCNC: 6.3 G/DL (ref 6.4–8.3)
RBC # BLD AUTO: 4.34 E12/L (ref 3.5–5.5)
SODIUM SERPL-SCNC: 141 MMOL/L (ref 132–146)
WBC # BLD: 7.3 E9/L (ref 4.5–11.5)

## 2023-03-26 PROCEDURE — 72125 CT NECK SPINE W/O DYE: CPT

## 2023-03-26 PROCEDURE — 80053 COMPREHEN METABOLIC PANEL: CPT

## 2023-03-26 PROCEDURE — 99285 EMERGENCY DEPT VISIT HI MDM: CPT

## 2023-03-26 PROCEDURE — 71045 X-RAY EXAM CHEST 1 VIEW: CPT

## 2023-03-26 PROCEDURE — 6360000004 HC RX CONTRAST MEDICATION: Performed by: RADIOLOGY

## 2023-03-26 PROCEDURE — 85025 COMPLETE CBC W/AUTO DIFF WBC: CPT

## 2023-03-26 PROCEDURE — 71260 CT THORAX DX C+: CPT

## 2023-03-26 PROCEDURE — 73502 X-RAY EXAM HIP UNI 2-3 VIEWS: CPT

## 2023-03-26 PROCEDURE — 70450 CT HEAD/BRAIN W/O DYE: CPT

## 2023-03-26 RX ADMIN — IOPAMIDOL 75 ML: 755 INJECTION, SOLUTION INTRAVENOUS at 23:28

## 2023-03-26 ASSESSMENT — PAIN - FUNCTIONAL ASSESSMENT
PAIN_FUNCTIONAL_ASSESSMENT: 0-10
PAIN_FUNCTIONAL_ASSESSMENT: 0-10

## 2023-03-26 ASSESSMENT — PAIN DESCRIPTION - FREQUENCY: FREQUENCY: CONTINUOUS

## 2023-03-26 ASSESSMENT — PAIN SCALES - GENERAL: PAINLEVEL_OUTOF10: 6

## 2023-03-26 ASSESSMENT — PAIN DESCRIPTION - PAIN TYPE: TYPE: ACUTE PAIN

## 2023-03-26 ASSESSMENT — PAIN DESCRIPTION - LOCATION: LOCATION: RIB CAGE;HIP

## 2023-03-26 ASSESSMENT — PAIN DESCRIPTION - ORIENTATION
ORIENTATION: LEFT
ORIENTATION: LEFT

## 2023-03-26 ASSESSMENT — PAIN DESCRIPTION - DESCRIPTORS: DESCRIPTORS: ACHING

## 2023-03-26 ASSESSMENT — LIFESTYLE VARIABLES: HOW OFTEN DO YOU HAVE A DRINK CONTAINING ALCOHOL: MONTHLY OR LESS

## 2023-03-27 VITALS
BODY MASS INDEX: 20.79 KG/M2 | SYSTOLIC BLOOD PRESSURE: 124 MMHG | TEMPERATURE: 97.9 F | HEART RATE: 75 BPM | OXYGEN SATURATION: 95 % | RESPIRATION RATE: 16 BRPM | DIASTOLIC BLOOD PRESSURE: 61 MMHG | WEIGHT: 123 LBS

## 2023-03-27 ASSESSMENT — PAIN - FUNCTIONAL ASSESSMENT: PAIN_FUNCTIONAL_ASSESSMENT: 0-10

## 2023-03-27 ASSESSMENT — PAIN DESCRIPTION - LOCATION: LOCATION: STERNUM

## 2023-03-27 ASSESSMENT — PAIN SCALES - GENERAL: PAINLEVEL_OUTOF10: 3
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
## 2023-04-30 SDOH — HEALTH STABILITY: PHYSICAL HEALTH: ON AVERAGE, HOW MANY DAYS PER WEEK DO YOU ENGAGE IN MODERATE TO STRENUOUS EXERCISE (LIKE A BRISK WALK)?: 5 DAYS

## 2023-04-30 SDOH — HEALTH STABILITY: PHYSICAL HEALTH: ON AVERAGE, HOW MANY MINUTES DO YOU ENGAGE IN EXERCISE AT THIS LEVEL?: 20 MIN

## 2023-05-03 ENCOUNTER — OFFICE VISIT (OUTPATIENT)
Dept: ORTHOPEDIC SURGERY | Age: 68
End: 2023-05-03

## 2023-05-03 VITALS — BODY MASS INDEX: 20.49 KG/M2 | HEIGHT: 65 IN | WEIGHT: 123 LBS

## 2023-05-03 DIAGNOSIS — S06.0X0A CONCUSSION WITHOUT LOSS OF CONSCIOUSNESS, INITIAL ENCOUNTER: Primary | ICD-10-CM

## 2023-05-03 RX ORDER — MECLIZINE HYDROCHLORIDE 25 MG/1
25 TABLET ORAL 3 TIMES DAILY PRN
Qty: 90 TABLET | Refills: 0 | Status: SHIPPED | OUTPATIENT
Start: 2023-05-03 | End: 2023-06-02
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
## 2023-06-02 ENCOUNTER — OFFICE VISIT (OUTPATIENT)
Dept: ORTHOPEDIC SURGERY | Age: 68
End: 2023-06-02

## 2023-06-02 VITALS — HEIGHT: 65 IN | BODY MASS INDEX: 20.49 KG/M2 | WEIGHT: 123 LBS

## 2023-06-02 DIAGNOSIS — S06.0X0D CONCUSSION WITHOUT LOSS OF CONSCIOUSNESS, SUBSEQUENT ENCOUNTER: Primary | ICD-10-CM
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
## 2023-09-26 ENCOUNTER — OFFICE VISIT (OUTPATIENT)
Dept: ENT CLINIC | Age: 68
End: 2023-09-26
Payer: MEDICARE

## 2023-09-26 VITALS — WEIGHT: 126 LBS | HEIGHT: 65 IN | BODY MASS INDEX: 20.99 KG/M2

## 2023-09-26 DIAGNOSIS — H61.23 BILATERAL IMPACTED CERUMEN: Primary | ICD-10-CM

## 2023-09-26 PROCEDURE — 69210 REMOVE IMPACTED EAR WAX UNI: CPT | Performed by: NURSE PRACTITIONER

## 2023-09-26 NOTE — PROGRESS NOTES
Subjective:      Patient ID:  Lyric Humphreys is a 70 y.o. female. HPI:    Pt presents with a history of cerumen impaction removal.   The patients ear was last cleaned 6 month(s) ago. The patient was not using ear drops to loosen wax immediately prior to this visit. Patient complains of mild muffled hearing at this time. Hearing aids: no      Past Medical History:   Diagnosis Date    Hx of mitral valve prolapse      Past Surgical History:   Procedure Laterality Date    BREAST REDUCTION SURGERY      CHOLECYSTECTOMY, LAPAROSCOPIC      Dr Viola Griffith    COLONOSCOPY  2008    Dr Vicki Arreaga  2004    HYSTERECTOMY, VAGINAL  1974    cervical cancer    TONSILLECTOMY  1959    VARICOSE VEIN SURGERY  2009     Family History   Problem Relation Age of Onset    Atrial Fibrillation Mother     COPD Mother     Hypertension Mother     Cancer Father         bladder    Kidney Disease Brother     Kidney stones Brother     Hypertension Brother      Social History     Socioeconomic History    Marital status:       Spouse name: None    Number of children: None    Years of education: None    Highest education level: None   Tobacco Use    Smoking status: Former     Packs/day: 1.00     Years: 46.00     Additional pack years: 0.00     Total pack years: 46.00     Types: Cigarettes     Start date: 0     Quit date: 2018     Years since quittin.7    Smokeless tobacco: Never    Tobacco comments:     quit 2018   Substance and Sexual Activity    Alcohol use: Yes     Comment: social drinker    Drug use: Never     Social Determinants of Health     Financial Resource Strain: Unknown (3/2/2021)    Overall Financial Resource Strain (CARDIA)     Difficulty of Paying Living Expenses: Patient refused   Food Insecurity: Unknown (3/2/2021)    Hunger Vital Sign     Worried About Running Out of Food in the Last Year: Patient refused     [address] in the Last Year: Patient refused

## 2023-11-02 ENCOUNTER — TELEPHONE (OUTPATIENT)
Dept: ENT CLINIC | Age: 68
End: 2023-11-02

## 2023-11-02 NOTE — TELEPHONE ENCOUNTER
Received staff message that patient left voicemail with questions for [facility]. Returned call to patient and left detailed voicemail with office number for a return call.   Electronically signed by Karl العلي LPN on 36/1/8403 at 91:22 AM

## 2023-11-06 NOTE — TELEPHONE ENCOUNTER
Patient called with questions on referrals, and dizziness and neurology in relation to a family member.   Electronically signed by [place] LPN on 42/1/4803 at 64:28 PM

## 2023-11-06 NOTE — TELEPHONE ENCOUNTER
Attempt #2 to reach patient and discuss questions for [employer]. Detailed voicemail left for patient.   Electronically signed by Tony De La O LPN on 17/7/9673 at 26:32 PM

## 2024-03-26 ENCOUNTER — OFFICE VISIT (OUTPATIENT)
Dept: ENT CLINIC | Age: 69
End: 2024-03-26
Payer: MEDICARE

## 2024-03-26 VITALS — HEIGHT: 65 IN | BODY MASS INDEX: 21.99 KG/M2 | WEIGHT: 132 LBS

## 2024-03-26 DIAGNOSIS — H61.23 BILATERAL IMPACTED CERUMEN: Primary | ICD-10-CM

## 2024-03-26 PROCEDURE — 69210 REMOVE IMPACTED EAR WAX UNI: CPT | Performed by: NURSE PRACTITIONER

## 2024-03-26 NOTE — PROGRESS NOTES
Subjective:      Patient ID:  Juanita Costello is a 68 y.o. female.    HPI:    Pt presents with a history of cerumen impaction removal.   The patients ear was last cleaned 6 month(s) ago.   The patient was not using ear drops to loosen wax immediately prior to this visit.      Hearing aids: no      Past Medical History:   Diagnosis Date    Hx of mitral valve prolapse      Past Surgical History:   Procedure Laterality Date    BREAST REDUCTION SURGERY      CHOLECYSTECTOMY, LAPAROSCOPIC      Dr Ventura    COLONOSCOPY  2008    Dr Montilla--normal    FOOT SURGERY      HERNIA REPAIR  2004    HYSTERECTOMY, VAGINAL  1974    cervical cancer    TONSILLECTOMY  1959    VARICOSE VEIN SURGERY  2009     Family History   Problem Relation Age of Onset    Atrial Fibrillation Mother     COPD Mother     Hypertension Mother     Cancer Father         bladder    Kidney Disease Brother     Kidney stones Brother     Hypertension Brother      Social History     Socioeconomic History    Marital status:      Spouse name: None    Number of children: None    Years of education: None    Highest education level: None   Tobacco Use    Smoking status: Former     Current packs/day: 0.00     Average packs/day: 1 pack/day for 46.0 years (46.0 ttl pk-yrs)     Types: Cigarettes     Start date:      Quit date: 2018     Years since quittin.2    Smokeless tobacco: Never    Tobacco comments:     quit 2018   Substance and Sexual Activity    Alcohol use: Yes     Comment: social drinker    Drug use: Never     Social Determinants of Health     Financial Resource Strain: Unknown (3/2/2021)    Overall Financial Resource Strain (CARDIA)     Difficulty of Paying Living Expenses: Patient declined   Food Insecurity: Unknown (3/2/2021)    Hunger Vital Sign     Worried About Running Out of Food in the Last Year: Patient declined     Ran Out of Food in the Last Year: Patient declined   Transportation Needs: Unknown (3/2/2021)    PRAPARE -

## 2024-08-21 ENCOUNTER — HOSPITAL ENCOUNTER (OUTPATIENT)
Age: 69
Discharge: HOME | End: 2024-08-23

## 2024-08-27 LAB — SURGICAL PATHOLOGY REPORT: NORMAL

## 2024-09-25 ENCOUNTER — OFFICE VISIT (OUTPATIENT)
Dept: ENT CLINIC | Age: 69
End: 2024-09-25
Payer: MEDICARE

## 2024-09-25 VITALS
DIASTOLIC BLOOD PRESSURE: 76 MMHG | HEIGHT: 64 IN | HEART RATE: 69 BPM | SYSTOLIC BLOOD PRESSURE: 125 MMHG | WEIGHT: 130 LBS | BODY MASS INDEX: 22.2 KG/M2

## 2024-09-25 DIAGNOSIS — H61.23 BILATERAL IMPACTED CERUMEN: Primary | ICD-10-CM

## 2024-09-25 PROCEDURE — 69210 REMOVE IMPACTED EAR WAX UNI: CPT | Performed by: NURSE PRACTITIONER

## 2025-03-26 ENCOUNTER — OFFICE VISIT (OUTPATIENT)
Dept: ENT CLINIC | Age: 70
End: 2025-03-26
Payer: MEDICARE

## 2025-03-26 VITALS
BODY MASS INDEX: 22.2 KG/M2 | WEIGHT: 130 LBS | HEIGHT: 64 IN | DIASTOLIC BLOOD PRESSURE: 79 MMHG | SYSTOLIC BLOOD PRESSURE: 147 MMHG | HEART RATE: 79 BPM

## 2025-03-26 DIAGNOSIS — H61.23 BILATERAL IMPACTED CERUMEN: Primary | ICD-10-CM

## 2025-03-26 PROCEDURE — 69210 REMOVE IMPACTED EAR WAX UNI: CPT | Performed by: NURSE PRACTITIONER

## 2025-03-26 NOTE — PROGRESS NOTES
DEPARTMENT OF OTOLARYNGOLOGY  CHRIS Coyne.O., Ms. Ed.  PAULA HollisO.  Zeb Momin, MSN, FNP-C          Subjective:      Patient ID:  Juanita Costello is a 69 y.o. female.    HPI:    Pt presents with a history of cerumen impaction removal.   The patients ear was last cleaned 6 month(s) ago.   The patient was not using ear drops to loosen wax immediately prior to this visit.      Hearing aids: no      Past Medical History:   Diagnosis Date    Hx of mitral valve prolapse      Past Surgical History:   Procedure Laterality Date    BREAST REDUCTION SURGERY      CHOLECYSTECTOMY, LAPAROSCOPIC      Dr Ventura    COLONOSCOPY  2008    Dr Montilla--normal    FOOT SURGERY      HERNIA REPAIR  2004    HYSTERECTOMY, VAGINAL  1974    cervical cancer    TONSILLECTOMY  1959    VARICOSE VEIN SURGERY  2009     Family History   Problem Relation Age of Onset    Atrial Fibrillation Mother     COPD Mother     Hypertension Mother     Cancer Father         bladder    Kidney Disease Brother     Kidney stones Brother     Hypertension Brother      Social History     Socioeconomic History    Marital status:      Spouse name: None    Number of children: None    Years of education: None    Highest education level: None   Tobacco Use    Smoking status: Former     Current packs/day: 0.00     Average packs/day: 1 pack/day for 46.0 years (46.0 ttl pk-yrs)     Types: Cigarettes     Start date:      Quit date: 2018     Years since quittin.2    Smokeless tobacco: Never    Tobacco comments:     quit 2018   Substance and Sexual Activity    Alcohol use: Yes     Comment: social drinker    Drug use: Never     Social Drivers of Health     Financial Resource Strain: Unknown (3/2/2021)    Overall Financial Resource Strain (CARDIA)     Difficulty of Paying Living Expenses: Patient declined   Food Insecurity: Unknown (3/2/2021)    Hunger Vital Sign     Worried About Running Out of Food in the Last Year:

## 2025-06-02 ENCOUNTER — HOSPITAL ENCOUNTER (OUTPATIENT)
Age: 70
Discharge: HOME | End: 2025-06-04

## 2025-06-02 PROCEDURE — 88173 CYTOPATH EVAL FNA REPORT: CPT

## 2025-06-02 PROCEDURE — 88305 TISSUE EXAM BY PATHOLOGIST: CPT

## 2025-06-04 LAB — NON-GYN CYTOLOGY REPORT: NORMAL

## 2025-06-20 ENCOUNTER — TELEPHONE (OUTPATIENT)
Dept: ENT CLINIC | Age: 70
End: 2025-06-20

## 2025-06-20 NOTE — TELEPHONE ENCOUNTER
Pt called in wanting to billy an appt for a lump on patroid gland US completed at [facility]. Please, advise on scheduling. Thank you. Kourtney can be reached at [phone].

## 2025-06-24 NOTE — TELEPHONE ENCOUNTER
Patient returned call. Left voicemail requesting a call back to discuss labs and scheduling with Dr. Hood.

## 2025-08-11 ENCOUNTER — OFFICE VISIT (OUTPATIENT)
Dept: ENT CLINIC | Age: 70
End: 2025-08-11
Payer: MEDICARE

## 2025-08-11 VITALS
HEART RATE: 88 BPM | DIASTOLIC BLOOD PRESSURE: 72 MMHG | SYSTOLIC BLOOD PRESSURE: 120 MMHG | HEIGHT: 64 IN | BODY MASS INDEX: 22.2 KG/M2 | TEMPERATURE: 98.3 F | WEIGHT: 130 LBS

## 2025-08-11 DIAGNOSIS — R59.1 LYMPHADENOPATHY: ICD-10-CM

## 2025-08-11 DIAGNOSIS — K11.8 PAROTID MASS: Primary | ICD-10-CM

## 2025-08-11 PROCEDURE — 1123F ACP DISCUSS/DSCN MKR DOCD: CPT | Performed by: OTOLARYNGOLOGY

## 2025-08-11 PROCEDURE — 1090F PRES/ABSN URINE INCON ASSESS: CPT | Performed by: OTOLARYNGOLOGY

## 2025-08-11 PROCEDURE — G8399 PT W/DXA RESULTS DOCUMENT: HCPCS | Performed by: OTOLARYNGOLOGY

## 2025-08-11 PROCEDURE — G8427 DOCREV CUR MEDS BY ELIG CLIN: HCPCS | Performed by: OTOLARYNGOLOGY

## 2025-08-11 PROCEDURE — 3017F COLORECTAL CA SCREEN DOC REV: CPT | Performed by: OTOLARYNGOLOGY

## 2025-08-11 PROCEDURE — G8420 CALC BMI NORM PARAMETERS: HCPCS | Performed by: OTOLARYNGOLOGY

## 2025-08-11 PROCEDURE — 99213 OFFICE O/P EST LOW 20 MIN: CPT | Performed by: OTOLARYNGOLOGY

## 2025-08-11 PROCEDURE — 1036F TOBACCO NON-USER: CPT | Performed by: OTOLARYNGOLOGY

## 2025-08-11 PROCEDURE — 1159F MED LIST DOCD IN RCRD: CPT | Performed by: OTOLARYNGOLOGY

## 2025-08-11 ASSESSMENT — VISUAL ACUITY: OU: 1

## 2025-08-21 ENCOUNTER — TELEPHONE (OUTPATIENT)
Dept: ADMINISTRATIVE | Age: 70
End: 2025-08-21